# Patient Record
Sex: MALE | Race: WHITE | Employment: FULL TIME | URBAN - METROPOLITAN AREA
[De-identification: names, ages, dates, MRNs, and addresses within clinical notes are randomized per-mention and may not be internally consistent; named-entity substitution may affect disease eponyms.]

---

## 2022-04-21 PROBLEM — S62.346A CLOSED NONDISPLACED FRACTURE OF BASE OF FIFTH METACARPAL BONE OF RIGHT HAND: Status: ACTIVE | Noted: 2022-04-21

## 2022-04-28 ENCOUNTER — HOSPITAL ENCOUNTER (OUTPATIENT)
Dept: GENERAL RADIOLOGY | Age: 52
Discharge: HOME | End: 2022-04-28
Attending: NURSE PRACTITIONER

## 2022-04-28 DIAGNOSIS — S62.346A CLOSED NONDISPLACED FRACTURE OF BASE OF FIFTH METACARPAL BONE OF RIGHT HAND, INITIAL ENCOUNTER: ICD-10-CM

## 2022-04-28 PROBLEM — M25.522 LEFT ELBOW PAIN: Status: ACTIVE | Noted: 2022-04-28

## 2022-04-28 PROCEDURE — 73130 X-RAY EXAM OF HAND: CPT

## 2022-05-26 ENCOUNTER — HOSPITAL ENCOUNTER (OUTPATIENT)
Dept: GENERAL RADIOLOGY | Age: 52
Discharge: HOME | End: 2022-05-28

## 2022-05-26 ENCOUNTER — OFFICE VISIT (OUTPATIENT)
Dept: ORTHOPEDIC SURGERY | Age: 52
End: 2022-05-26

## 2022-05-26 DIAGNOSIS — S62.346A CLOSED NONDISPLACED FRACTURE OF BASE OF FIFTH METACARPAL BONE OF RIGHT HAND, INITIAL ENCOUNTER: Primary | ICD-10-CM

## 2022-05-26 DIAGNOSIS — S62.346A CLOSED NONDISPLACED FRACTURE OF BASE OF FIFTH METACARPAL BONE OF RIGHT HAND, INITIAL ENCOUNTER: ICD-10-CM

## 2022-05-26 PROCEDURE — 99024 POSTOP FOLLOW-UP VISIT: CPT | Performed by: NURSE PRACTITIONER

## 2022-05-26 PROCEDURE — 73130 X-RAY EXAM OF HAND: CPT

## 2022-05-26 NOTE — PROGRESS NOTES
[facility] Note    Name: Elsa Roach  YOB: 1970  Gender: male  MRN: [id number]      Follow up visit:   1. Closed nondisplaced fracture of base of fifth metacarpal bone of right hand, initial encounter        HPI: Elsa Roach is a 49 y.o. male who is following up for right base of fifth metacarpal fracture. He is almost 6 weeks out from injury. He says his pain is getting better. Page Greener he has been out of the brace at night for the last 3 nights. He says he is noticed he is able to do more things with the hand. He is doing his home exercises. .      ROS/Meds/PSH/PMH/FH/SH: I personally reviewed the patients standard intake form. Pertinents are discussed in the HPI    Physical Examination:    Musculoskeletal Examination:  Examination on the right upper extremity demonstrates cap refill < 5 seconds in all fingers  Patient has no swelling or ecchymosis to the right hand. He is only slightly tender palpation over the base of the fifth metacarpal.  He is unable to make a complete composite fist, but this is baseline due to an old injury. He is neurovascular intact with good capillary refill. Imaging / Electrodiagnostic Tests:     X-rays include a 3 view right hand reviewed. Fractures maintaining alignment and healing    Assessment:     ICD-10-CM    1. Closed nondisplaced fracture of base of fifth metacarpal bone of right hand, initial encounter  S62.346A XR HAND RIGHT (MIN 3 VIEWS)     CANCELED: XR HAND RIGHT (MIN 3 VIEWS)     CANCELED: XR HAND RIGHT (MIN 3 VIEWS)       Plan:   We discussed the diagnosis and different treatment options. We discussed observation, therapy, antiinflammatory medications and other pertinent treatment modalities. After discussing in detail the patient elects to proceed with weaning out of the brace. He will focus on range of motion and strengthening. We will see him back in 6 weeks with a final x-ray.   I told him if he is doing well he could cancel that appointment. .     Patient voiced accordance and understanding of the information provided and the formulated plan. All questions were answered to the patient's satisfaction during the encounter.     Treatment at this time: Home exercises    LAMBERT Mathis CNP  Orthopaedic Surgery  05/26/22  1:24 PM